# Patient Record
Sex: FEMALE | Race: WHITE | Employment: FULL TIME | URBAN - METROPOLITAN AREA
[De-identification: names, ages, dates, MRNs, and addresses within clinical notes are randomized per-mention and may not be internally consistent; named-entity substitution may affect disease eponyms.]

---

## 2020-10-28 ENCOUNTER — OFFICE VISIT (OUTPATIENT)
Dept: ONCOLOGY | Age: 65
End: 2020-10-28
Payer: COMMERCIAL

## 2020-10-28 ENCOUNTER — DOCUMENTATION ONLY (OUTPATIENT)
Dept: ONCOLOGY | Age: 65
End: 2020-10-28

## 2020-10-28 VITALS
RESPIRATION RATE: 18 BRPM | BODY MASS INDEX: 21.56 KG/M2 | HEART RATE: 83 BPM | SYSTOLIC BLOOD PRESSURE: 113 MMHG | OXYGEN SATURATION: 100 % | DIASTOLIC BLOOD PRESSURE: 78 MMHG | TEMPERATURE: 97.8 F | HEIGHT: 70 IN | WEIGHT: 150.6 LBS

## 2020-10-28 DIAGNOSIS — D05.12 DUCTAL CARCINOMA IN SITU (DCIS) OF LEFT BREAST: Primary | ICD-10-CM

## 2020-10-28 PROCEDURE — 99244 OFF/OP CNSLTJ NEW/EST MOD 40: CPT | Performed by: INTERNAL MEDICINE

## 2020-10-28 RX ORDER — DICYCLOMINE HYDROCHLORIDE 20 MG/1
TABLET ORAL
COMMUNITY
Start: 2020-09-09 | End: 2021-11-08

## 2020-10-28 RX ORDER — LISINOPRIL 5 MG/1
TABLET ORAL DAILY
COMMUNITY
End: 2021-08-02 | Stop reason: ALTCHOICE

## 2020-10-28 RX ORDER — NITROFURANTOIN (MACROCRYSTALS) 100 MG/1
CAPSULE ORAL
COMMUNITY
End: 2021-11-08

## 2020-10-28 NOTE — PATIENT INSTRUCTIONS
Tamoxifen (By mouth) Tamoxifen (yx-ADA-c-fen) Treats breast cancer. May prevent breast cancer in women who have a high risk. Brand Name(s): Nolvadex, Soltamox There may be other brand names for this medicine. When This Medicine Should Not Be Used: You should not use this medicine if you have had an allergic reaction to tamoxifen, or if you are pregnant. You should not use this medicine if you are also using blood thinners (such as warfarin, Coumadin®), or if you have had a blood clot or blood clotting problems. How to Use This Medicine:  
Liquid, Tablet · Medicines used to treat cancer are very strong and can have many side effects. Before receiving this medicine, make sure you understand all the risks and benefits. It is important for you to work closely with your doctor during your treatment. · This medicine should come with a Medication Guide. Ask your pharmacist for a copy if you do not have one. · Your doctor will tell you how much medicine to use. Do not use more than directed. You may need to take this medicine for 5 years or longer. · Swallow the tablet whole. You may take this medicine with or without food. · Measure the oral liquid medicine with a marked measuring spoon, oral syringe, or medicine cup. · Take this medicine at the same time each day. If a dose is missed: · Take a dose as soon as you remember. If it is almost time for your next dose, wait until then and take a regular dose. Do not take extra medicine to make up for a missed dose. How to Store and Dispose of This Medicine: · Store the medicine in a closed container at room temperature, away from heat, moisture, and direct light. Do not store in the refrigerator or freezer. · Ask your pharmacist, doctor, or health caregiver about the best way to dispose of any leftover medicine after you have finished your treatment. You will also need to throw away old medicine after the expiration date has passed. · Keep all medicine out of the reach of children. Never share your medicine with anyone. Drugs and Foods to Avoid: Ask your doctor or pharmacist before using any other medicine, including over-the-counter medicines, vitamins, and herbal products. · Make sure your doctor knows if you are also using aminoglutethimide (Cytadren®), bromocriptine (Parlodel®), letrozole (Femara®), rifampin (Rifadin®, Rimactane®), or other cancer treatments. · Birth control pills, implants, or shots may not work while you are using tamoxifen. To keep from getting pregnant, use another form of birth control. Other forms include condoms, a diaphragm, or contraceptive foam or jelly. Warnings While Using This Medicine: · It is not safe to take this medicine during pregnancy. It could harm an unborn baby. Tell your doctor right away if you become pregnant. Keep using effective birth control for at least 2 months after you stop treatment. · To make sure you are not pregnant, you may start taking this medicine while you are having your menstrual period. Also, you must have a negative pregnancy test before you will be allowed to take this medicine. · Make sure your doctor knows if you have liver disease, cataracts, eye or vision problems, hypercalcemia (high calcium in the blood), or high cholesterol or triglycerides (fat) in the blood. · Do not breastfeed while you are using this medicine. · Your doctor will check your progress and the effects of this medicine at regular visits. Keep all appointments. It is important for women to have regular gynecologic check-ups while taking tamoxifen. · Make sure any doctor or dentist who treats you knows that you are using this medicine. · This medicine may increase your risk of developing other rare but serious conditions, such as stroke, a blood clot in the lungs or veins, or cancer of the uterus. Talk with your doctor about these risks and your personal situation. · This medicine may cause changes in your menstrual periods, which could be a sign of a serious problem. Tell your doctor about any unusual vaginal bleeding or discharge. · Some of the side effects of this medicine may not appear for months or years, or after you have stopped using this medicine. Tell your doctor if you have later side effects. · Liver problems may occur while you are using this medicine. Stop using this medicine and check with your doctor right away if you are having more than one of these symptoms: abdominal pain or tenderness; piotr-colored stools; dark urine; decreased appetite; fever; headache; itching; loss of appetite; nausea and vomiting; skin rash; swelling of the feet or lower legs; unusual tiredness or weakness; or yellow eyes or skin. Possible Side Effects While Using This Medicine:  
Call your doctor right away if you notice any of these side effects: · Allergic reaction: Itching or hives, swelling in your face or hands, swelling or tingling in your mouth or throat, chest tightness, trouble breathing · Chest pain, shortness of breath, or coughing up blood. · Dark-colored urine or pale stools. · Fever, chills, cough, sore throat, and body aches. · Heavy or abnormal vaginal bleeding, pelvic pain or pressure. · Nausea, vomiting, loss of appetite, or pain in your upper stomach. · New breast lumps. · Numbness or weakness in your arm or leg, or on one side of your body. · Pain in your lower leg (calf). · Sudden or severe headache, or problems with vision, speech, or walking. · Swelling in your hands, ankles, or feet. · Unusual bleeding, bruising, or weakness. · Yellowing of your skin or the whites of your eyes. If you notice these less serious side effects, talk with your doctor: · Back or joint pain. · Blurred vision, change in color vision. · Constipation, diarrhea, or stomach pain or upset. · Headache. · Hot flashes, vaginal discharge. · Increased tumor pain or bone pain. · Loss of interest in sex or trouble having sex (in men). · Rash. · Trouble with sleeping. If you notice other side effects that you think are caused by this medicine, tell your doctor. Call your doctor for medical advice about side effects. You may report side effects to FDA at 3-399-FDA-4852 © 2017 [address]  Information is for End User's use only and may not be sold, redistributed or otherwise used for commercial purposes. The above information is an  only. It is not intended as medical advice for individual conditions or treatments. Talk to your doctor, nurse or pharmacist before following any medical regimen to see if it is safe and effective for you.

## 2020-10-28 NOTE — PROGRESS NOTES
[facility] [place] [address] [facility]  [place]: [phone]  F: [phone]      Reason for Visit:   Charissa Herrera is a 62 y.o. female who is seen in consultation at the request of Dr. Eulalia Ruvalcaba for evaluation of therapy for breast cancer    Treatment History:   · 5/27/20 left breast core bx, 2 sites:  DCIS, gr 3, solid, ER + at 95%, IL negative  · [facility] genetic testing negative 6/2020  · 7/29/20 left breast retroareolar tissue negative, left ax SLN bx 0/2 LN  · 8/12/20 left breast mastectomy:  DCIS, solid and comedo-type, 6 mm, gr 3, 0/2 LN (prior specimen), pTis (DCISm) pN0 cM0    History of Present Illness: An abnormal mammogram led to the pathology above    FH: no breast, ovarian, prostate, pancreas cancer    Past Medical History:   Diagnosis Date    Cancer [facility])       History reviewed. No pertinent surgical history. Social History     Tobacco Use    Smoking status: Never Smoker    Smokeless tobacco: Never Used   Substance Use Topics    Alcohol use: Not Currently      History reviewed. No pertinent family history. Current Outpatient Medications   Medication Sig    lisinopriL (PRINIVIL, ZESTRIL) 5 mg tablet Take  by mouth daily.  dicyclomine (BENTYL) 20 mg tablet TAKE 1 TABLET BY MOUTH 3 TIMES A DAY AS NEEDED    nitrofurantoin (Macrodantin) 100 mg capsule Take  by mouth nightly. No current facility-administered medications for this visit. Allergies   Allergen Reactions    Other Food Anaphylaxis     Shellfish.  Other Medication Rash and Nausea and Vomiting     Codiene. Review of Systems: A complete review of systems was obtained, negative except as described above.     Physical Exam:     Visit Vitals  /78 (BP 1 Location: Right arm, BP Patient Position: Sitting)   Pulse 83   Temp 97.8 °F (36.6 °C) (Temporal)   Resp 18   Ht 5' 10\" (1.778 m)   Wt 150 lb 9.6 oz (68.3 kg)   SpO2 100%   BMI 21.61 kg/m²     ECOG PS: 0    Constitutional: Appears well-developed and well-nourished in no apparent distress      Mental status: Alert and awake, Oriented to person/place/time, Able to follow commands    Eyes: EOM normal, Sclera normal, No visible ocular discharge    HENT: Normocephalic, atraumatic    Mouth/Throat: Moist mucous membranes    External Ears normal    Neck: No visualized mass    Pulmonary/Chest: Respiratory effort normal, No visualized signs of difficulty breathing or respiratory distress    Musculoskeletal: Normal gait with no signs of ataxia, Normal range of motion of neck    Neurological: No facial asymmetry (Cranial nerve 7 motor function), No gaze palsy    Skin: No rash    Psychiatric: Normal affect, No hallucinations               Results:   No results found for: WBC, HGB, HCT, PLT, MCV, ANEU, HGBPOC, HCTPOC, HGBEXT, HCTEXT, PLTEXT, HGBEXT, HCTEXT, PLTEXT  No results found for: NA, K, CL, CO2, GLU, BUN, CREA, GFRAA, GFRNA, CA, NAPOC, KPOCT, CLPOC, GLUCPOC, IBUN, CREAPOC, ICAI  No results found for: TBILI, ALT, AP, TP, ALB, GLOB    6/9/20 MRI breast  Left breast 4.5 cm biopsy cavity, 6 mm of residual enhancing lesion inferiorly and posteriorly  Right breast negative    Records reviewed and summarized above. Pathology report(s) reviewed above. Radiology report(s) reviewed above. Assessment/plan:   1. Left breast DCIS, gr 3, 6 mm, 0/2 LN, ER +, PA negative:  Stage 0    The major issue for our consultation today was the use of tamoxifen in patients with DCIS that expresses estrogen and/or progesterone receptors. The following was discussed. The [employer] (nccn.org) guidelines suggest consideration of tamoxifen for women with DCIS treated with lumpectomy and radiation and who have hormone-receptor-positive tumors.  Randomized trials in patients with DCIS suggest that tamoxifen would further reduce the risk of in-breast recurrence by about 30-40% in this patient, and decrease this patient's risk of contralateral breast cancer by about half. At present and from the literature, the risk of in-breast recurrence after radiation would probably be about 5-10 % at 10 years. Tamoxifen would decrease this risk by 30-40%. Her risk of contralateral breast cancer is 0.5-1% a year, and tamoxifen would also decrease that risk by about half. About half of recurrences are invasive and half DCIS. However, from the data available, tamoxifen has not been associated with an improvement in survival. Aromatase inhibitors have been studied in this setting, and are able to be used with similar results as well. The risks and benefits of tamoxifen were discussed in detail and the patient was informed of the following: Risks include a 1% risk of endometrial cancer for postmenopausal women treated for five years but no (or a minimally increased) risk in premenopausal women and that most women who develop tamoxifen-associated endometrial cancer can be cured. Any bleeding in a postmenopausal woman should be reported to a health care professional. There is also a 1% risk of blood clots (thromboembolism) that can be fatal. All patients irrespective of age who take tamoxifen and who have not had a hysterectomy should have a pelvic exam and Pap smear yearly. Tamoxifen increases the risk of cataract formation and on rare occasions has caused retinal damage: an eye exam is recommended yearly. Other risks include vaginal discharge or dryness, the development or worsening of hot flashes or vasomotor symptoms, and bone loss in premenopausal women. There is excellent evidence that tamoxifen does not increase risk of depression, cause weight gain or have a major effect on sexual function. Available data suggests little or no effect on cognitive function. Benefits include a lowering of cholesterol and a reduction in the rate of bone loss for postmenopausal woman. Any other symptoms should be reported.     The risks and benefits of aromatase inhibitors (anastrozole, letrozole, and exemestane) were discussed in detail and the patient was informed of the following: Risks include the development of painful muscles and joints (arthralgia/myalgia) and bone loss. Muscle and joint pain can be severe but rarely result in any tissue damage; symptoms usually resolve in several weeks when the medication is stopped. Bone loss is common and a bone density test is recommended as a baseline and then yearly to every several years depending on initial results. The risk of fractures is increased by a few percent in patients taking these drugs, but careful monitoring of bone density and using bone protecting agents when indicated can minimize these risks. Unlike tamoxifen there is no increased risk of blood clots or endometrial cancer. AIs can cause or worsen vaginal dryness but women using these drugs should not use vaginal estrogen preparations for these symptoms. AIs can also cause or increase hot flashes. Any other symptoms should be reported. Also discussed the data from sabcs 2018 using tamoxifen 10 mg every other day x 3 years with similar results to historical data. After this discussion, she will consider tamoxifen, handout provided. She will call us with her decision. Follow-up after early breast cancer was discussed. I recommend follow-up as defined by the American Society of Clinical Oncology and [facility]. This includes a visit to a health care professional every 3-6 months for 3 years, then every 6-12 months for 2 years, and then yearly as well as mammograms yearly. She lives in [facility] and will be moving back there in the next few months    2. Emotional well being:  She has excellent support and is coping well with her disease    I appreciate the opportunity to participate in Ms. Korin Garces [facility]. Signed By: Saintclair Foreman, MD      No orders of the defined types were placed in this encounter.

## 2020-10-29 NOTE — PROGRESS NOTES
Oncology Social Work  Psychosocial Assessment    Reason for Assessment:      [x] Social Work Referral [x] Initial Assessment [x] New Diagnosis [] Other    Advance Care Planning:  No flowsheet data found. Sources of Information:    [x]Patient  []Family  [x]Staff  [x]Medical Record    Mental Status:    [x]Alert  []Lethargic  []Unresponsive   [] Unable to assess   Oriented to:  [x]Person  [x]Place  [x]Time  [x]Situation      Relationship Status:  []Single  [x]  []Significant Other/Life Partner  []  []  []    Living Circumstances:  []Lives Alone  [x]Family/Significant Other in Household  []Roommates  []Children in the Home  []Paid Caregivers  []Assisted Living Facility/Group Home  []Skilled [address]  []Homeless  []Incarcerated  []Environmental/Care Concerns  []Other:    Employment Status:  [x]Employed Full-time []Employed Part-time []Homemaker  [] Retired [] Short-Term Disability [] [facility]  [] Unemployed     Barriers to Learning:    []Language  []Developmental  []Cognitive  []Altered Mental Status  []Visual/Hearing Impairment  []Unable to Read/Write  []Motivational  [] Challenges Understanding Medical Jargon [x]No Barriers Identified      Financial/Legal Concerns:    []Uninsured  []Limited Income/Resources  []Non-Citizen  []Food Insecurity [x]No Concerns Identified   []Other:    [employer]ist/Spiritual/Existential:  Does patient have any spiritual or Mormonism beliefs? [] Yes [] No  Is the patient involved in a spiritual, paty or Mormonism community?  [] Yes [] No  Patient expressing spiritual/existential angst? [] Yes [] No  Notes:    Support System:    Identified Support Person/Group: , Family  [x]Strong  []Fair  []Limited    Coping with Illness:  [] Challenges Coping with Serious Illness [] Situational Depression [] Situational Anxiety [] Anticipatory Grief  [] Recent Loss [] Caregiver [place]           Narrative: Patient here for consultation with Dr. Tahira Shelley for the management of breast cancer. Met with patient to introduce social work role and supports. Patient lives in [facility] but has been here on an extended visit with family due to [place]. Patient is  and has 5 adult children. Her and her  are staying with one of their daughter during this time. They have several family members who are local.     Patient is actively coping with diagnosis. She is receiving supportive counseling with  oncology social worker. She is interested in joining a support group but voiced some hesitation due to cultural differences has she has been living in [facility] 15 years. Provided encouraged me to attend. She also tells me her brother was diagnosed with cancer at the some time she was. Discussed supports they could utilize together. Plan: Patient is actively coping with diagnosis and treatment. She is well supported by family with practical and emotional needs. She is being followed by the Oncology Social Worker at [facility] and has utilized mindfulness techniques. She plans to attended their breast cancer support group this evening. Provided additional support resources offered a [employer] and referred her to our next virtual support group on 11/23. No barriers identified at this time. Patient encouraged to contact me for ongoing psychosocial support.      Referral:   Complementary therapies referral  Support Groups referral    Thank you,  Kenny Hansen LCSW

## 2020-11-25 ENCOUNTER — TELEPHONE (OUTPATIENT)
Dept: ONCOLOGY | Age: 65
End: 2020-11-25

## 2020-11-25 DIAGNOSIS — D05.12 DUCTAL CARCINOMA IN SITU (DCIS) OF LEFT BREAST: Primary | ICD-10-CM

## 2020-11-25 RX ORDER — TAMOXIFEN CITRATE 10 MG/1
TABLET, FILM COATED ORAL
Qty: 45 TAB | Refills: 2 | Status: SHIPPED | OUTPATIENT
Start: 2020-11-25 | End: 2021-07-30

## 2020-11-25 NOTE — TELEPHONE ENCOUNTER
Returned call to patient. After last visit, patient is agreeable to trying tamoxifen 10 mg every other day, rx called in. Patient is currently residing in [facility], however here to with her family. She plans on returning to [place] [facility] [place] [facility] in the next few months to pack up and move back to the [facility]. Discussed with her about follow up. Plan to follow up with patient in 6 months. Will have front office call patient next week to schedule appointment.

## 2020-11-25 NOTE — TELEPHONE ENCOUNTER
Oct 28 she saw Dr Angy Branch---  Pt wanted to start the Tomoxafin    [facility]

## 2020-11-30 ENCOUNTER — TELEPHONE (OUTPATIENT)
Dept: ONCOLOGY | Age: 65
End: 2020-11-30

## 2020-12-02 ENCOUNTER — TELEPHONE (OUTPATIENT)
Dept: ONCOLOGY | Age: 65
End: 2020-12-02

## 2020-12-16 ENCOUNTER — TELEPHONE (OUTPATIENT)
Dept: ONCOLOGY | Age: 65
End: 2020-12-16

## 2021-07-29 DIAGNOSIS — D05.12 DUCTAL CARCINOMA IN SITU (DCIS) OF LEFT BREAST: ICD-10-CM

## 2021-07-30 RX ORDER — TAMOXIFEN CITRATE 10 MG/1
TABLET, FILM COATED ORAL
Qty: 45 TABLET | Refills: 3 | Status: SHIPPED | OUTPATIENT
Start: 2021-07-30

## 2021-08-02 ENCOUNTER — OFFICE VISIT (OUTPATIENT)
Dept: ONCOLOGY | Age: 66
End: 2021-08-02
Payer: COMMERCIAL

## 2021-08-02 VITALS
BODY MASS INDEX: 22.33 KG/M2 | OXYGEN SATURATION: 100 % | TEMPERATURE: 98 F | RESPIRATION RATE: 18 BRPM | HEIGHT: 70 IN | HEART RATE: 70 BPM | SYSTOLIC BLOOD PRESSURE: 149 MMHG | WEIGHT: 156 LBS | DIASTOLIC BLOOD PRESSURE: 94 MMHG

## 2021-08-02 DIAGNOSIS — D05.12 DUCTAL CARCINOMA IN SITU (DCIS) OF LEFT BREAST: Primary | ICD-10-CM

## 2021-08-02 PROCEDURE — 99213 OFFICE O/P EST LOW 20 MIN: CPT | Performed by: INTERNAL MEDICINE

## 2021-08-02 RX ORDER — DESVENLAFAXINE SUCCINATE 50 MG/1
50 TABLET, EXTENDED RELEASE ORAL
COMMUNITY

## 2021-08-02 RX ORDER — TELMISARTAN 80 MG/1
TABLET ORAL
COMMUNITY
Start: 2021-06-25

## 2021-08-02 NOTE — PROGRESS NOTES
[facility] at [address] [facility]  W: [phone]  F: [phone]      Reason for Visit:   Jaimee Cam is a 60 y.o. female who is seen in follow up for breast cancer    Breast Surgeon: Dr. Nataliya Arnold    Treatment History:   · 5/27/20 left breast core bx, 2 sites:  DCIS, gr 3, solid, ER + at 95%, VT negative  · [facility] genetic testing negative 6/2020  · 7/29/20 left breast retroareolar tissue negative, left ax SLN bx 0/2 LN  · 8/12/20 left breast mastectomy:  DCIS, solid and comedo-type, 6 mm, gr 3, 0/2 LN (prior specimen), pTis (DCISm) pN0 cM0  · Tamoxifen 11/25/2020-     History of Present Illness: An abnormal mammogram led to the pathology above    FH: no breast, ovarian, prostate, pancreas cancer    Interval Hx: She presents today for follow up. She reports gr 1 constipation, gr 1 fatigue, gr 1 hot flashes, gr 1 insomnia, gr 1 concentration, gr 1 cough, gr 1 edema, gr 1 urinary frequency, gr 1 libido, gr 1 vaginal dryness. Pain to joints rated 3/10. Also complains of weight gain 8-10lbs and a yeast infection. Past Medical History:   Diagnosis Date    Cancer [facility])       History reviewed. No pertinent surgical history. Social History     Tobacco Use    Smoking status: Never Smoker    Smokeless tobacco: Never Used   Substance Use Topics    Alcohol use: Not Currently      History reviewed. No pertinent family history. Current Outpatient Medications   Medication Sig    telmisartan (MICARDIS) 80 mg tablet TAKE 1 TABLET BY MOUTH EVERY DAY    desvenlafaxine succinate (Pristiq) 50 mg ER tablet Take 50 mg by mouth.  tamoxifen (NOLVADEX) 10 mg tablet TAKE 1 TABLET BY MOUTH  EVERY OTHER DAY    dicyclomine (BENTYL) 20 mg tablet TAKE 1 TABLET BY MOUTH 3 TIMES A DAY AS NEEDED    nitrofurantoin (Macrodantin) 100 mg capsule Take  by mouth nightly. No current facility-administered medications for this visit.       Allergies   Allergen Reactions    Other Food Anaphylaxis     Shellfish.  Other Medication Rash and Nausea and Vomiting     Codiene. Review of Systems: A complete review of systems was obtained, negative except as described above. Physical Exam:     Visit Vitals  BP (!) 149/94 (BP 1 Location: Right arm, BP Patient Position: Sitting, BP Cuff Size: Large adult)   Pulse 70   Temp 98 °F (36.7 °C) (Temporal)   Resp 18   Ht 5' 10\" (1.778 m)   Wt 156 lb (70.8 kg)   SpO2 100%   BMI 22.38 kg/m²     ECOG PS: 0    Constitutional: Appears well-developed and well-nourished in no apparent distress      Mental status: Alert and awake, Oriented to person/place/time, Able to follow commands    Eyes: EOM normal, Sclera normal, No visible ocular discharge    HENT: Normocephalic, atraumatic    Mouth/Throat: Moist mucous membranes    External Ears normal    Neck: No visualized mass    Pulmonary/Chest: Respiratory effort normal, No visualized signs of difficulty breathing or respiratory distress    Musculoskeletal: Normal gait with no signs of ataxia, Normal range of motion of neck    Neurological: No facial asymmetry (Cranial nerve 7 motor function), No gaze palsy    Skin: No rash    Psychiatric: Normal affect, No hallucinations               Results:   No results found for: WBC, HGB, HCT, PLT, MCV, ANEU, HGBPOC, HCTPOC, HGBEXT, HCTEXT, PLTEXT, HGBEXT, HCTEXT, PLTEXT  No results found for: NA, K, CL, CO2, GLU, BUN, CREA, GFRAA, GFRNA, CA, NAPOC, KPOCT, CLPOC, GLUCPOC, IBUN, CREAPOC, ICAI  No results found for: TBILI, ALT, AP, TP, ALB, GLOB    6/9/20 MRI breast  Left breast 4.5 cm biopsy cavity, 6 mm of residual enhancing lesion inferiorly and posteriorly  Right breast negative    Records reviewed and summarized above. Pathology report(s) reviewed above. Radiology report(s) reviewed above. Assessment/plan:   1.  Left breast DCIS, gr 3, 6 mm, 0/2 LN, ER +, TN negative:  Stage 0    The major issue for our consultation today was the use of tamoxifen in patients with DCIS that expresses estrogen and/or progesterone receptors. The following was discussed. The [employer] (nccn.org) guidelines suggest consideration of tamoxifen for women with DCIS treated with lumpectomy and radiation and who have hormone-receptor-positive tumors. Randomized trials in patients with DCIS suggest that tamoxifen would further reduce the risk of in-breast recurrence by about 30-40% in this patient, and decrease this patient's risk of contralateral breast cancer by about half. At present and from the literature, the risk of in-breast recurrence after radiation would probably be about 5-10 % at 10 years. Tamoxifen would decrease this risk by 30-40%. Her risk of contralateral breast cancer is 0.5-1% a year, and tamoxifen would also decrease that risk by about half. About half of recurrences are invasive and half DCIS. However, from the data available, tamoxifen has not been associated with an improvement in survival. Aromatase inhibitors have been studied in this setting, and are able to be used with similar results as well. The risks and benefits of tamoxifen were discussed in detail and the patient was informed of the following: Risks include a 1% risk of endometrial cancer for postmenopausal women treated for five years but no (or a minimally increased) risk in premenopausal women and that most women who develop tamoxifen-associated endometrial cancer can be cured. Any bleeding in a postmenopausal woman should be reported to a health care professional. There is also a 1% risk of blood clots (thromboembolism) that can be fatal. All patients irrespective of age who take tamoxifen and who have not had a hysterectomy should have a pelvic exam and Pap smear yearly. Tamoxifen increases the risk of cataract formation and on rare occasions has caused retinal damage: an eye exam is recommended yearly.  Other risks include vaginal discharge or dryness, the development or worsening of hot flashes or vasomotor symptoms, and bone loss in premenopausal women. There is excellent evidence that tamoxifen does not increase risk of depression, cause weight gain or have a major effect on sexual function. Available data suggests little or no effect on cognitive function. Benefits include a lowering of cholesterol and a reduction in the rate of bone loss for postmenopausal woman. Any other symptoms should be reported. Also discussed the data from [facility] 2018 using tamoxifen 10 mg every other day x 3 years with similar results to historical data. After this discussion, she started tamoxifen 10 mg every other day. She is tolerating well. Will continue. Last mammo 7/8/2021 and negative (performed at [facility]). Eye Doctor: sees annually  GYN: she needs a GYN. Referral placed    Follow-up after early breast cancer was discussed. I recommend follow-up as defined by the American Society of Clinical Oncology and [place]. This includes a visit to a health care professional every 3-6 months for 3 years, then every 6-12 months for 2 years, and then yearly as well as mammograms yearly. She moved back from [facility]. 2. Emotional well being:  She has excellent support and is coping well with her disease. She has been stressed about her brother on hospice for brain cancer. Her mother is moving in for her. 3. Hot Flashes: tolerable    4. Joint pain: tolerable. 5. HTN: managed by PCP    This patient was seen in conjunction with Gal Partida NP. I personally reviewed the history and all points in the assessment and plan, and performed key points on the exam. Left breast DCIS, ER +, on tamoxifen every other day. Tolerating well, RTC 1 year      I appreciate the opportunity to participate in Ms. Jackie stevens. Signed By: Mathew Crowe MD      No orders of the defined types were placed in this encounter.

## 2021-08-02 NOTE — PROGRESS NOTES
Arthea Gitelman is a 60 y.o. female follow up for breast cancer. 1. Have you been to the ER, urgent care clinic since your last visit? Hospitalized since your last visit?no     2. Have you seen or consulted any other health care providers outside of the [address] [place] [address] since your last visit? Include any pap smears or colon screening.  no

## 2021-10-27 ENCOUNTER — TELEPHONE (OUTPATIENT)
Dept: ONCOLOGY | Age: 66
End: 2021-10-27

## 2021-10-27 NOTE — TELEPHONE ENCOUNTER
LVM for patient about her referral to see a OBGYN let there number for her to call to make the appointment [phone]

## 2021-11-08 ENCOUNTER — OFFICE VISIT (OUTPATIENT)
Dept: OBGYN CLINIC | Age: 66
End: 2021-11-08
Payer: COMMERCIAL

## 2021-11-08 VITALS
WEIGHT: 166 LBS | HEIGHT: 70 IN | BODY MASS INDEX: 23.77 KG/M2 | SYSTOLIC BLOOD PRESSURE: 130 MMHG | DIASTOLIC BLOOD PRESSURE: 92 MMHG

## 2021-11-08 DIAGNOSIS — Z01.419 WELL WOMAN EXAM WITH ROUTINE GYNECOLOGICAL EXAM: ICD-10-CM

## 2021-11-08 DIAGNOSIS — Z01.419 WELL WOMAN EXAM WITH ROUTINE GYNECOLOGICAL EXAM: Primary | ICD-10-CM

## 2021-11-08 PROCEDURE — 99386 PREV VISIT NEW AGE 40-64: CPT | Performed by: OBSTETRICS & GYNECOLOGY

## 2021-11-08 NOTE — PROGRESS NOTES
Davin Simms is a No obstetric history on file. ,  60 y.o. female  whose LMP was on  who presents for her annual checkup. She is having c/o taking Tamoxifen as she was told it could cause cancer and to follow-up with GYN. Started January 2021 every other day. Has had no bleeding. Menstrual status:    She is not having periods because she is menopausal.    The patient is not using HRT. Contraception:    She does not need contraception because she is menopausal.    Sexual history:    She  reports being sexually active and has had partner(s) who are male. Medical conditions:    Since her last annual GYN exam about one year ago, she has had the following changes in her health history: none. Pap and Mammogram History:    Her most recent Pap smear was normal obtained 1 year(s) ago. The patient had a recent mammogram in July at the breast surgeon office. which was negative for malignancy. Breast Cancer History/Substance Abuse:    She has a personal history of breast cancer. Osteoporosis History:    Family history does not include a first or second degree relative with osteopenia or osteoporosis. A bone density scan has not been previously obtained. Past Medical History:   Diagnosis Date    [facility] [facility]     Cancer ([facility] [address] [facility]      Past Surgical History:   Procedure Laterality Date    HX LYMPHADENECTOMY  2020    HX MASTECTOMY  2020    left breast    HX OTHER SURGICAL      implant of right breast    HX TONSILLECTOMY  1975     Current Outpatient Medications   Medication Sig Dispense Refill    telmisartan (MICARDIS) 80 mg tablet TAKE 1 TABLET BY MOUTH EVERY DAY      desvenlafaxine succinate (Pristiq) 50 mg ER tablet Take 50 mg by mouth.       tamoxifen (NOLVADEX) 10 mg tablet TAKE 1 TABLET BY MOUTH  EVERY OTHER DAY 45 Tablet 3    dicyclomine (BENTYL) 20 mg tablet TAKE 1 TABLET BY MOUTH 3 TIMES A DAY AS NEEDED (Patient not taking: Reported on 11/8/2021)      nitrofurantoin (Macrodantin) 100 mg capsule Take  by mouth nightly. (Patient not taking: Reported on 11/8/2021)       Allergies: Other food and Other medication   Social History     Socioeconomic History    Marital status:      Spouse name: Not on file    Number of children: Not on file    Years of education: Not on file    Highest education level: Not on file   Occupational History    Not on file   Tobacco Use    Smoking status: Never Smoker    Smokeless tobacco: Never Used   Vaping Use    Vaping Use: Never used   Substance and Sexual Activity    Alcohol use: Yes    Drug use: Never    Sexual activity: Yes     Partners: Male   Other Topics Concern    Not on file   Social History Narrative    Not on file     Social Determinants of Health     Financial Resource Strain:     Difficulty of Paying Living Expenses: Not on file   Food Insecurity:     Worried About Running Out of Food in the Last Year: Not on file    Yo of Food in the Last Year: Not on file   Transportation Needs:     Lack of Transportation (Medical): Not on file    Lack of Transportation (Non-Medical):  Not on file   Physical Activity:     Days of Exercise per Week: Not on file    Minutes of Exercise per Session: Not on file   Stress:     Feeling of Stress : Not on file   Social Connections:     Frequency of Communication with Friends and Family: Not on file    Frequency of Social Gatherings with Friends and Family: Not on file    Attends [employer] Services: Not on file    Active Member of Clubs or Organizations: Not on file    Attends Club or Organization Meetings: Not on file    Marital Status: Not on file   Intimate Partner Violence:     Fear of Current or Ex-Partner: Not on file    Emotionally Abused: Not on file    Physically Abused: Not on file    Sexually Abused: Not on file   Housing Stability:     Unable to Pay for Housing in the Last Year: Not on file    Number of Places Lived in the Last Year: Not on file    Unstable Housing in the Last Year: Not on file     Tobacco History:  reports that she has never smoked. She has never used smokeless tobacco.  Alcohol Abuse:  reports current alcohol use. Drug Abuse:  reports no history of drug use. There is no problem list on file for this patient.         Review of Systems - History obtained from the patient  Constitutional: negative for weight loss, fever, night sweats  HEENT: negative for hearing loss, earache, congestion, snoring, sorethroat  CV: negative for chest pain, palpitations, edema  Resp: negative for cough, shortness of breath, wheezing  GI: negative for change in bowel habits, abdominal pain, black or bloody stools  : negative for frequency, dysuria, hematuria, vaginal discharge  MSK: negative for back pain, joint pain, muscle pain  Breast: negative for breast lumps, nipple discharge, galactorrhea  Skin :negative for itching, rash, hives  Neuro: negative for dizziness, headache, confusion, weakness  Psych: negative for anxiety, depression, change in mood  Heme/lymph: negative for bleeding, bruising, pallor    Physical Exam    Visit Vitals  BP (!) 130/92   Ht 5' 10\" (1.778 m)   Wt 166 lb (75.3 kg)   BMI 23.82 kg/m²     Constitutional  · Appearance: well-nourished, well developed, alert, in no acute distress    HENT  · Head and Face: appears normal    Neck  · Inspection/Palpation: normal appearance, no masses or tenderness  · Lymph Nodes: no lymphadenopathy present    Chest  · Respiratory Effort: breathing normal    Breasts  · Inspection of Breasts: breasts symmetrical, no skin changes, no discharge present, nipple appearance normal, no skin retraction present  · Palpation of Breasts and Axillae: no masses present on palpation, no breast tenderness  · Axillary Lymph Nodes: no lymphadenopathy present    Gastrointestinal  · Abdominal Examination: abdomen non-tender to palpation, normal bowel sounds, no masses present  · Liver and spleen: no hepatomegaly present, spleen not palpable  · Hernias: no hernias identified    Skin  · General Inspection: no rash, no lesions identified    Neurologic/Psychiatric  · Mental Status:  · Orientation: grossly oriented to person, place and time  · Mood and Affect: mood normal, affect appropriate    Genitourinary  · External Genitalia: normal appearance for age, no discharge present, no tenderness present, no inflammatory lesions present, no masses present, no atrophy present  · Vagina: normal vaginal vault without central or paravaginal defects, no discharge present, no inflammatory lesions present, no masses present  · Bladder: non-tender to palpation  · Urethra: appears normal  · Cervix: normal   · Uterus: normal size, shape and consistency  · Adnexa: no adnexal tenderness present, no adnexal masses present  · Perineum: perineum within normal limits, no evidence of trauma, no rashes or skin lesions present  · Anus: anus within normal limits, no hemorrhoids present  · Inguinal Lymph Nodes: no lymphadenopathy present    Assessment:  Routine gynecologic examination  Her current medical status is satisfactory with no evidence of significant gynecologic issues. Plan:  Counseled re: diet, exercise, healthy lifestyle  Return for yearly wellness visits  Rec annual mammogram  Patient Verbalized understanding  Advised to report any vaginal bleeding.   Advised to use vaginal moisturizers for vaginal dryness,

## 2021-11-08 NOTE — PATIENT INSTRUCTIONS

## 2021-11-11 LAB
CYTOLOGIST CVX/VAG CYTO: NORMAL
CYTOLOGY CVX/VAG DOC CYTO: NORMAL
CYTOLOGY CVX/VAG DOC THIN PREP: NORMAL
CYTOLOGY HISTORY:: NORMAL
DX ICD CODE: NORMAL
HPV I/H RISK 4 DNA CVX QL PROBE+SIG AMP: NEGATIVE
Lab: NORMAL
OTHER STN SPEC: NORMAL
STAT OF ADQ CVX/VAG CYTO-IMP: NORMAL

## 2022-07-14 ENCOUNTER — TELEPHONE (OUTPATIENT)
Dept: ONCOLOGY | Age: 67
End: 2022-07-14

## 2022-07-18 NOTE — TELEPHONE ENCOUNTER
7/18/22 6:46 p.m.- Spoke to patient who states that she stopped her Tamoxifen a couple months ago due to joint pain in her thumbs and hands that made it difficult for her to use her hands, and also joint pain in her knees that made it difficult to go up and down the stairs, and also vaginal dryness. Patient states at the time she stopped it she was trying to help take care of her brother and mother, who both passed away in June. Patient states she is trying to get back on track now and is wondering what other options she has and if there is another medication she could try in place of the Tamoxifen. Patient states that the joint pain has basically resolved now, except for her normal joint pain that she gets in her knees sometimes. Advised patient that Dr. Jenny Thomas, NP, would be notified of above and someone from our office would call her back with recommendations. Patient voices understanding and denies any further questions at this time.

## 2022-07-25 NOTE — TELEPHONE ENCOUNTER
Returned call to patient. She states she started having worsened joint pain and vaginal dryness on tamoxifen. All symptoms resolved off tamoxifen. Discussed that due to her diagnosis of DCIS Dr. Sultana Zhao would not recommend alternative treatment and ok with her stopping tamoxifen. Patient verbalized understanding and asked for follow up to be cancelled 7/18/22, she will call back if she wishes to have follow up.

## 2025-06-06 ENCOUNTER — OFFICE VISIT (OUTPATIENT)
Age: 70
End: 2025-06-06

## 2025-06-06 VITALS
SYSTOLIC BLOOD PRESSURE: 130 MMHG | HEART RATE: 69 BPM | WEIGHT: 163 LBS | OXYGEN SATURATION: 96 % | BODY MASS INDEX: 23.39 KG/M2 | TEMPERATURE: 98.8 F | RESPIRATION RATE: 14 BRPM | DIASTOLIC BLOOD PRESSURE: 79 MMHG

## 2025-06-06 DIAGNOSIS — N39.0 ACUTE UTI: ICD-10-CM

## 2025-06-06 DIAGNOSIS — R30.9 PAIN WITH URINATION: Primary | ICD-10-CM

## 2025-06-06 LAB
BILIRUBIN, URINE, POC: NEGATIVE
BLOOD URINE, POC: NEGATIVE
GLUCOSE URINE, POC: NEGATIVE
KETONES, URINE, POC: NEGATIVE
LEUKOCYTE ESTERASE, URINE, POC: ABNORMAL
NITRITE, URINE, POC: NEGATIVE
PH, URINE, POC: 7 (ref 4.6–8)
PROTEIN,URINE, POC: NEGATIVE
SPECIFIC GRAVITY, URINE, POC: 1.01 (ref 1–1.03)
URINALYSIS CLARITY, POC: CLEAR
URINALYSIS COLOR, POC: YELLOW
UROBILINOGEN, POC: ABNORMAL MG/DL

## 2025-06-06 RX ORDER — NITROFURANTOIN 25; 75 MG/1; MG/1
100 CAPSULE ORAL 2 TIMES DAILY
Qty: 14 CAPSULE | Refills: 0 | Status: SHIPPED | OUTPATIENT
Start: 2025-06-06 | End: 2025-06-13

## 2025-06-06 RX ORDER — PHENAZOPYRIDINE HYDROCHLORIDE 200 MG/1
200 TABLET, FILM COATED ORAL 3 TIMES DAILY PRN
Qty: 9 TABLET | Refills: 0 | Status: SHIPPED | OUTPATIENT
Start: 2025-06-06 | End: 2025-06-09

## 2025-06-06 RX ORDER — LEVOTHYROXINE SODIUM 25 UG/1
TABLET ORAL
COMMUNITY

## 2025-06-06 ASSESSMENT — ENCOUNTER SYMPTOMS
NAUSEA: 0
ABDOMINAL PAIN: 0
VOMITING: 0

## 2025-06-06 NOTE — PROGRESS NOTES
Lena Gonzalez is a 69 y.o. female     New patient, here for evaluation of the following chief complaint(s):  Urinary Tract Infection (C/o possibly having UTI or BV, symptoms started )        Assessment & Plan :  Visit Diagnoses and Associated Orders         Pain with urination    -  Primary    AMB POC URINALYSIS DIP STICK MANUAL W/O MICRO [63176 CPT(R)]             Acute UTI        Urine culture (clean catch) [46839 Custom]           ORDERS WITHOUT AN ASSOCIATED DIAGNOSIS    levothyroxine (SYNTHROID) 25 MCG tablet [4420]      nitrofurantoin, macrocrystal-monohydrate, (MACROBID) 100 MG capsule [35533]      phenazopyridine (PYRIDIUM) 200 MG tablet [6194]            - Acute urinary tract infection.  Macrobid as prescribed.  -Symptom management with Pyridium.  -GYN follow-up if symptoms persist.  -Increase fluid intake.   Follow up in 3 days if symptoms persist or if symptoms worsen.  AMB POC URINALYSIS DIP STICK MANUAL W/O MICRO        Component Value Flag Ref Range Units Status    Color (UA POC) Yellow        Final    Clarity (UA POC) Clear        Final    Glucose, Urine, POC Negative        Final    Bilirubin, Urine, POC Negative        Final    Ketones, Urine, POC Negative        Final    Specific Gravity, Urine, POC 1.015      1.001 - 1.035  Final    Blood (UA POC) Negative        Final    pH, Urine, POC 7.0      4.6 - 8.0  Final    Protein, Urine, POC Negative        Final    Urobilinogen, POC 0.2 mg/dL      <1.1 mg/dL Final    Nitrite, Urine, POC Negative      Negative  Final    Leukocyte Esterase, Urine, POC 1+        Final                        HPI:   69 y.o. female presents with symptoms of dysuria, frequency, urgency, and suprapubic pressure x 2 days.  Denies vaginal itching or irritation.  Denies fever, chills, nausea, vomiting or generalized myalgia.     Subjective :    Urinary Tract Infection          Review of Systems   Constitutional:  Negative for activity change, appetite change, chills,

## 2025-06-06 NOTE — PATIENT INSTRUCTIONS
- Urine culture sent, will notify if treatment changes indicated.    - Increase fluid intake    - Follow-up with primary care or GYN for worsening symptoms

## 2025-06-08 LAB
BACTERIA SPEC CULT: NORMAL
SERVICE CMNT-IMP: NORMAL